# Patient Record
Sex: MALE | Race: WHITE | NOT HISPANIC OR LATINO | Employment: OTHER | URBAN - METROPOLITAN AREA
[De-identification: names, ages, dates, MRNs, and addresses within clinical notes are randomized per-mention and may not be internally consistent; named-entity substitution may affect disease eponyms.]

---

## 2022-02-22 ENCOUNTER — TELEPHONE (OUTPATIENT)
Dept: UROLOGY | Facility: CLINIC | Age: 72
End: 2022-02-22

## 2022-02-22 NOTE — TELEPHONE ENCOUNTER
----- Message from Stephanie Abrams sent at 2/22/2022  4:14 PM CST -----  Monalisa w/ [facility] [address] [phone] Hospital Consult: Dr José Manuel Ruby, DX: Renal stone w/ Hydronephrosis Rm: [phone]

## 2022-02-24 ENCOUNTER — OUTSIDE PLACE OF SERVICE (OUTPATIENT)
Dept: UROLOGY | Facility: CLINIC | Age: 72
End: 2022-02-24

## 2022-02-24 ENCOUNTER — OUTSIDE PLACE OF SERVICE (OUTPATIENT)
Dept: UROLOGY | Facility: CLINIC | Age: 72
End: 2022-02-24
Payer: MEDICARE

## 2022-02-24 PROCEDURE — 99223 1ST HOSP IP/OBS HIGH 75: CPT | Mod: 25,,, | Performed by: UROLOGY

## 2022-02-24 PROCEDURE — 99223 PR INITIAL HOSPITAL CARE,LEVL III: ICD-10-PCS | Mod: 25,,, | Performed by: UROLOGY

## 2022-02-24 PROCEDURE — 52332 CYSTOSCOPY AND TREATMENT: CPT | Mod: RT,,, | Performed by: UROLOGY

## 2022-02-24 PROCEDURE — 52332 PR CYSTOSCOPY,INSERT URETERAL STENT: ICD-10-PCS | Mod: RT,,, | Performed by: UROLOGY

## 2022-02-24 PROCEDURE — 74420 UROGRAPHY RTRGR +-KUB: CPT | Mod: 26,,, | Performed by: UROLOGY

## 2022-02-24 PROCEDURE — 74420 PR  X-RAY RETROGRADE PYELOGRAM: ICD-10-PCS | Mod: 26,,, | Performed by: UROLOGY

## 2022-03-08 ENCOUNTER — TELEPHONE (OUTPATIENT)
Dept: UROLOGY | Facility: CLINIC | Age: 72
End: 2022-03-08
Payer: MEDICARE

## 2022-03-08 NOTE — TELEPHONE ENCOUNTER
Spoke with missy and informed her his surgery is outpatient.    ----- Message from Rodolfo Gross sent at 3/8/2022  1:42 PM CST -----  Contact: [facility]  Please call Missy from [facility] regarding patient procedure    Is procedure outpatient or inpatient      Call back #  [phone]  ask for Ruth Frazier

## 2022-03-15 ENCOUNTER — OFFICE VISIT (OUTPATIENT)
Dept: UROLOGY | Facility: CLINIC | Age: 72
End: 2022-03-15
Payer: MEDICARE

## 2022-03-15 VITALS
WEIGHT: 170 LBS | DIASTOLIC BLOOD PRESSURE: 70 MMHG | HEART RATE: 70 BPM | BODY MASS INDEX: 23.8 KG/M2 | HEIGHT: 71 IN | SYSTOLIC BLOOD PRESSURE: 122 MMHG

## 2022-03-15 DIAGNOSIS — N20.0 KIDNEY STONE: Primary | ICD-10-CM

## 2022-03-15 LAB
ANION GAP SERPL CALC-SCNC: 4 MMOL/L (ref 3–11)
APPEARANCE, UA: CLEAR
BACTERIA SPEC CULT: ABNORMAL /HPF
BASOPHILS NFR BLD: 0.3 % (ref 0–3)
BILIRUB UR QL STRIP: NEGATIVE MG/DL
BUN SERPL-MCNC: 23 MG/DL (ref 7–18)
BUN/CREAT SERPL: 23.23 RATIO (ref 7–18)
CALCIUM SERPL-MCNC: 9 MG/DL (ref 8.8–10.5)
CHLORIDE SERPL-SCNC: 107 MMOL/L (ref 100–108)
CO2 SERPL-SCNC: 32 MMOL/L (ref 21–32)
COLOR UR: ABNORMAL
CREAT SERPL-MCNC: 0.99 MG/DL (ref 0.7–1.3)
EOSINOPHIL NFR BLD: 1.6 % (ref 1–3)
ERYTHROCYTE [DISTWIDTH] IN BLOOD BY AUTOMATED COUNT: 12.4 % (ref 12.5–18)
GFR ESTIMATION: > 60
GLUCOSE (UA): NORMAL MG/DL
GLUCOSE SERPL-MCNC: 80 MG/DL (ref 70–110)
HCT VFR BLD AUTO: 37.2 % (ref 42–52)
HGB BLD-MCNC: 12.4 G/DL (ref 14–18)
HGB UR QL STRIP: 250 /UL
KETONES UR QL STRIP: NEGATIVE MG/DL
LEUKOCYTE ESTERASE UR QL STRIP: 25 /UL
LYMPHOCYTES NFR BLD: 31.9 % (ref 25–40)
MCH RBC QN AUTO: 31.7 PG (ref 27–31.2)
MCHC RBC AUTO-ENTMCNC: 33.3 G/DL (ref 31.8–35.4)
MCV RBC AUTO: 95.1 FL (ref 80–97)
MONOCYTES NFR BLD: 14.6 % (ref 1–15)
NEUTROPHILS # BLD AUTO: 1.89 10*3/UL (ref 1.8–7.7)
NEUTROPHILS NFR BLD: 51.1 % (ref 37–80)
NITRITE UR QL STRIP: NEGATIVE
NUCLEATED RED BLOOD CELLS: 0 %
PH UR STRIP: 6 PH (ref 5–9)
PLATELETS: 200 10*3/UL (ref 142–424)
POTASSIUM SERPL-SCNC: 4.8 MMOL/L (ref 3.6–5.2)
PROT UR QL STRIP: ABNORMAL MG/DL
RBC # BLD AUTO: 3.91 10*6/UL (ref 4.7–6.1)
RBC #/AREA URNS HPF: ABNORMAL /HPF (ref 0–2)
SERVICE COMMENT 03: ABNORMAL
SODIUM BLD-SCNC: 143 MMOL/L (ref 135–145)
SP GR UR STRIP: 1.02 (ref 1–1.03)
SPECIMEN COLLECTION METHOD, URINE: ABNORMAL
SQUAMOUS EPITHELIAL, UA: ABNORMAL /LPF
UROBILINOGEN UR STRIP-ACNC: NORMAL MG/DL
WBC # BLD: 3.7 10*3/UL (ref 4.6–10.2)
WBC #/AREA URNS HPF: ABNORMAL /HPF (ref 0–5)

## 2022-03-15 PROCEDURE — 99214 PR OFFICE/OUTPT VISIT, EST, LEVL IV, 30-39 MIN: ICD-10-PCS | Mod: S$GLB,,, | Performed by: UROLOGY

## 2022-03-15 PROCEDURE — 99214 OFFICE O/P EST MOD 30 MIN: CPT | Mod: S$GLB,,, | Performed by: UROLOGY

## 2022-03-15 RX ORDER — AMLODIPINE BESYLATE 5 MG/1
5 TABLET ORAL DAILY
COMMUNITY
Start: 2022-01-28

## 2022-03-15 RX ORDER — DIVALPROEX SODIUM 500 MG/1
1000 TABLET, DELAYED RELEASE ORAL
COMMUNITY

## 2022-03-15 RX ORDER — CYANOCOBALAMIN 1000 UG/ML
INJECTION, SOLUTION INTRAMUSCULAR; SUBCUTANEOUS
COMMUNITY
Start: 2022-01-28

## 2022-03-15 RX ORDER — VILAZODONE HYDROCHLORIDE 40 MG/1
TABLET ORAL
COMMUNITY
Start: 2022-02-24

## 2022-03-15 RX ORDER — TRAZODONE HYDROCHLORIDE 50 MG/1
50 TABLET ORAL
COMMUNITY
Start: 2022-02-23

## 2022-03-15 RX ORDER — SERTRALINE HYDROCHLORIDE 50 MG/1
50 TABLET, FILM COATED ORAL DAILY
COMMUNITY
Start: 2022-01-28

## 2022-03-15 RX ORDER — MEMANTINE HYDROCHLORIDE 5 MG/1
TABLET ORAL
COMMUNITY
Start: 2022-02-16

## 2022-03-15 RX ORDER — PANTOPRAZOLE SODIUM 40 MG/1
TABLET, DELAYED RELEASE ORAL
COMMUNITY
Start: 2022-02-16

## 2022-03-15 NOTE — PROGRESS NOTES
Subjective:       Patient ID: Joshua Ventura is a 71 y.o. male.    Chief Complaint: No chief complaint on file.      HPI:  71-year-old male seen urgently in the hospital for an infected obstructed stone right ureteral stent was placed at the time with purulence drainage of urine immediately noted patient has been on antibiotics and has recovered from his urinary infection he is here for formal management    Past Medical History:   Past Medical History:   Diagnosis Date    Alzheimer's disease, unspecified (CODE)     Calculus of ureter     Conversion disorder with seizures or convulsions     Depression     Gastroesophageal reflux disease     Hydroureter     Hypertension     Vitamin B deficiency        Past Surgical Historical: History reviewed. No pertinent surgical history.     Medications:   Medication List with Changes/Refills   Current Medications    AMLODIPINE (NORVASC) 5 MG TABLET    Take 5 mg by mouth once daily.    CYANOCOBALAMIN 1,000 MCG/ML INJECTION    INJECT ONE ML IN THE MUSCLE ONCE A WEEK    DIVALPROEX (DEPAKOTE) 500 MG TBEC    1,000 mg.    MEMANTINE (NAMENDA) 5 MG TAB        PANTOPRAZOLE (PROTONIX) 40 MG TABLET        SERTRALINE (ZOLOFT) 50 MG TABLET    Take 50 mg by mouth once daily.    TRAZODONE (DESYREL) 50 MG TABLET    50 mg.    VIIBRYD 40 MG TAB TABLET            Past Social History:   Social History     Socioeconomic History    Marital status:    Tobacco Use    Smoking status: Never Smoker    Smokeless tobacco: Never Used   Substance and Sexual Activity    Alcohol use: Yes    Drug use: Never       Allergies:   Review of patient's allergies indicates:   Allergen Reactions    Oxycodone         Family History: History reviewed. No pertinent family history.     Review of Systems:  Review of Systems   Constitutional: Negative for activity change and appetite change.   HENT: Negative for congestion and dental problem.    Eyes: Negative for visual disturbance.   Respiratory:  Negative for chest tightness and shortness of breath.    Cardiovascular: Negative for chest pain.   Gastrointestinal: Negative for abdominal distention and abdominal pain.   Genitourinary: Negative for decreased urine volume, difficulty urinating, dysuria, enuresis, flank pain, frequency, genital sores, hematuria, penile discharge, penile pain, penile swelling, scrotal swelling, testicular pain and urgency.   Musculoskeletal: Negative for back pain and neck pain.   Skin: Negative for color change.   Neurological: Negative for dizziness.   Hematological: Negative for adenopathy.   Psychiatric/Behavioral: Negative for agitation, behavioral problems and confusion.       Physical Exam:  Physical Exam  Constitutional:       General: He is not in acute distress.     Appearance: He is well-developed.   HENT:      Head: Normocephalic and atraumatic.      Nose: Nose normal.   Eyes:      General: No scleral icterus.     Conjunctiva/sclera: Conjunctivae normal.      Pupils: Pupils are equal, round, and reactive to light.   Neck:      Thyroid: No thyromegaly.      Trachea: No tracheal deviation.   Cardiovascular:      Rate and Rhythm: Normal rate and regular rhythm.      Heart sounds: Normal heart sounds.   Pulmonary:      Effort: Pulmonary effort is normal. No respiratory distress.      Breath sounds: Normal breath sounds. No wheezing or rales.   Abdominal:      General: Bowel sounds are normal. There is no distension.      Palpations: Abdomen is soft.      Tenderness: There is no abdominal tenderness. There is no guarding or rebound.   Genitourinary:     Penis: Normal. No tenderness.       Prostate: Normal.   Musculoskeletal:         General: No deformity. Normal range of motion.      Cervical back: Neck supple.   Lymphadenopathy:      Cervical: No cervical adenopathy.   Skin:     General: Skin is warm and dry.      Findings: No erythema or rash.   Neurological:      Mental Status: He is alert and oriented to person, place,  and time.      Cranial Nerves: No cranial nerve deficit.   Psychiatric:         Behavior: Behavior normal.         Assessment/Plan:       Problem List Items Addressed This Visit    None     Visit Diagnoses     Kidney stone    -  Primary    Relevant Orders    Ambulatory Referral to External Surgery             Right ureteral stone:  Had a long discussed with the patient today about his procedures we will proceed with right ureteroscopy next available date

## 2022-03-25 ENCOUNTER — OUTSIDE PLACE OF SERVICE (OUTPATIENT)
Dept: UROLOGY | Facility: CLINIC | Age: 72
End: 2022-03-25
Payer: MEDICARE

## 2022-03-25 DIAGNOSIS — N20.0 KIDNEY STONE: Primary | ICD-10-CM

## 2022-03-25 PROCEDURE — 74420 UROGRAPHY RTRGR +-KUB: CPT | Mod: 26,,, | Performed by: UROLOGY

## 2022-03-25 PROCEDURE — 52352 CYSTOURETERO W/STONE REMOVE: CPT | Mod: RT,,, | Performed by: UROLOGY

## 2022-03-25 PROCEDURE — 52332 PR CYSTOSCOPY,INSERT URETERAL STENT: ICD-10-PCS | Mod: 51,RT,, | Performed by: UROLOGY

## 2022-03-25 PROCEDURE — 52352 PR CYSTO/URETERO/PYELOSCOPY, CALCULUS TX: ICD-10-PCS | Mod: RT,,, | Performed by: UROLOGY

## 2022-03-25 PROCEDURE — 52332 CYSTOSCOPY AND TREATMENT: CPT | Mod: 51,RT,, | Performed by: UROLOGY

## 2022-03-25 PROCEDURE — 74420 PR  X-RAY RETROGRADE PYELOGRAM: ICD-10-PCS | Mod: 26,,, | Performed by: UROLOGY

## 2022-03-28 ENCOUNTER — TELEPHONE (OUTPATIENT)
Dept: UROLOGY | Facility: CLINIC | Age: 72
End: 2022-03-28
Payer: MEDICARE

## 2022-03-28 LAB
CALCULI COMPOSITION: NORMAL
CALCULI DESCRIPTION: NORMAL
CALCULI MASS: 14 MG
CALCULI PDF: NORMAL

## 2022-03-28 NOTE — TELEPHONE ENCOUNTER
----- Message from Hui Rodriges sent at 3/28/2022  8:51 AM CDT -----  Patient returning call to nurse. Call back number [phone]. Tks

## 2022-04-13 ENCOUNTER — TELEPHONE (OUTPATIENT)
Dept: UROLOGY | Facility: CLINIC | Age: 72
End: 2022-04-13
Payer: MEDICARE

## 2022-04-13 NOTE — PATIENT INSTRUCTIONS
Patient Education       Ureteral Stent Discharge Instructions   About this topic   The kidneys remove waste from the blood and get rid of it through your urine. Normally, your urine drains from your kidneys through a narrow tube into your bladder and out of your body. This narrow tube is a ureter. Some diseases may block the flow of urine through your ureter. Then, your urine can back up, cause pain, damage your kidneys, and cause infection.  A ureteral stent is a thin tube that allows urine to drain from the body when the normal flow of urine through your ureter is blocked. The ends of the tube are shaped like a coil to hold the tube in place. One end rests in your bladder and the other end rests in your kidney. This tube keeps the ureter open so urine can pass from your kidneys into your bladder and out of the body.  How long you will need the stent will depend on the reason for it. Most often, the stent can be taken out in a few days or weeks. If it is needed longer than a few months, it will need to be replaced. Some kinds of stents can be left in for much longer. Your doctor will decide how long you will need your stent.  A stent may be used to bypass a blockage in the ureter caused by a stone, tumor, swelling, or narrowing.     What care is needed at home?   Ask your doctor what you need to do when you go home. Make sure you ask questions if you do not understand what the doctor says. This way you will know what you need to do.  Take all drugs as ordered by your doctor.  Drink 6 to 8 glasses of liquids each day to avoid an infection unless your doctor asks you to limit fluids for some other health problem.  If you have a stent with a thread that is outside your body, make sure you do not pull on the thread and pull the stent out.  What follow-up care is needed?   Your doctor may ask you to make visits to the office to check on your progress. Be sure to keep these visits.  A cystoscope is a telescope-like  device put into the opening where urine comes out of your body. It can also be used to place the stent or take out the stent. You may be given a local pain drug for this. Some stents have a string or thread attached to them that stays outside the body. You or the doctor can take out this kind of stent by pulling on the thread. Talk with your doctor about when you will have your stent removed.  If your stent needs to be in longer than 3 months, it will need to be exchanged to make sure it doesnt get stuck.  What drugs may be needed?   The doctor may order drugs to:  Help with pain  Relax bladder muscles  Prevent or fight an infection  Will physical activity be limited?   You may go back to your normal daily activity when your doctor tells you it is OK.  What problems could happen?   Infection  Bladder irritation  Blood in the urine  Ureteral injury  Stent moves out of place  Stent gets clogged  You are not able to pass urine  Scarring of the urethra or ureter  When do I need to call the doctor?   Signs of infection. These include a fever of 100.4°F (38°C) or higher; chills; pain with passing urine; urine changes color, becomes cloudy, or smells bad; or not able to pass urine.  Very bad pain  Lots of blood in your urine  Urine is dribbling out most of the time  Stent comes out  Teach Back: Helping You Understand   The Teach Back Method helps you understand the information we are giving you. The idea is simple. After talking with the staff, tell them in your own words what you were just told. This helps to make sure the staff has covered each thing clearly. It also helps to explain things that may have been a bit confusing. Before going home, make sure you are able to do these:  I can tell you about my procedure.  I can tell you how I will take care of the needle site.  I can tell you what I will do if I have a fever, pain with passing urine, my urine is cloudy or bloody, or other signs of problems.  Where can I learn  more?   [employer]  https://www.radiologyinfo.org/en/info.cfm?pg=ureteralnephro   [place] [employer]  https://www.baus.org.uk/_userfiles/pages/files/Patients/Leaflets/Ureteric%20stent%20insertion.pdf   Last Reviewed Date   2019-08-23  Consumer Information Use and Disclaimer   This information is not specific medical advice and does not replace information you receive from your health care provider. This is only a brief summary of general information. It does NOT include all information about conditions, illnesses, injuries, tests, procedures, treatments, therapies, discharge instructions or life-style choices that may apply to you. You must talk with your health care provider for complete information about your health and treatment options. This information should not be used to decide whether or not to accept your health care providers advice, instructions or recommendations. Only your health care provider has the knowledge and training to provide advice that is right for you.  Copyright   Copyright © 2021 [employer] and its affiliates and/or licensors. All rights reserved.

## 2022-04-14 ENCOUNTER — PROCEDURE VISIT (OUTPATIENT)
Dept: UROLOGY | Facility: CLINIC | Age: 72
End: 2022-04-14
Payer: MEDICARE

## 2022-04-14 VITALS
RESPIRATION RATE: 16 BRPM | WEIGHT: 178 LBS | OXYGEN SATURATION: 98 % | BODY MASS INDEX: 24.83 KG/M2 | HEART RATE: 60 BPM | SYSTOLIC BLOOD PRESSURE: 174 MMHG | DIASTOLIC BLOOD PRESSURE: 80 MMHG

## 2022-04-14 DIAGNOSIS — N20.0 KIDNEY STONE: ICD-10-CM

## 2022-04-14 PROCEDURE — 52310 CYSTOSCOPY AND TREATMENT: CPT | Mod: S$GLB,,, | Performed by: UROLOGY

## 2022-04-14 PROCEDURE — 52310 CYSTOSCOPY: ICD-10-PCS | Mod: S$GLB,,, | Performed by: UROLOGY

## 2022-04-14 NOTE — PROCEDURES
Cystoscopy    Date/Time: 4/14/2022 11:30 AM  Performed by: Jovany Gordon MD  Authorized by: Jovany Gordon MD     Consent Done?:  Yes (Written)  Timeout: prior to procedure the correct patient, procedure, and site was verified    Prep: patient was prepped and draped in usual sterile fashion    Anesthesia:  Intraurethral instillation  Position:  Supine  Anesthesia:  Intraurethral instillation  Patient sedated?: No    Preparation: Patient was prepped and draped in usual sterile fashion    Scope type:  Flexible cystoscope  Stent removed: Yes     Patient tolerated the procedure well with no immediate complications  Comments:      Patient was brought to the procedure room placed on the table in spine position. The patient was prepped and draped in the usual sterile fashion. The cystoscope was inserted into the urethra advanced the urethra and bladder were normal the stent was visualized it was grasped and removed the bladder was inspected found to be free of tumor stone or foreign body.  The patient tolerated the procedure well there were no complications

## 2022-05-11 PROCEDURE — 87088 URINE BACTERIA CULTURE: CPT | Performed by: NURSE PRACTITIONER

## 2022-05-13 ENCOUNTER — LAB REQUISITION (OUTPATIENT)
Dept: LAB | Facility: HOSPITAL | Age: 72
End: 2022-05-13
Payer: MEDICARE

## 2022-05-13 DIAGNOSIS — R41.3 OTHER AMNESIA: ICD-10-CM

## 2022-05-13 DIAGNOSIS — G47.19 OTHER HYPERSOMNIA: ICD-10-CM

## 2022-05-13 DIAGNOSIS — F03.90 UNSPECIFIED DEMENTIA WITHOUT BEHAVIORAL DISTURBANCE: ICD-10-CM

## 2022-05-15 LAB — BACTERIA UR CULT: NORMAL

## 2025-06-30 ENCOUNTER — LAB REQUISITION (OUTPATIENT)
Dept: LAB | Facility: HOSPITAL | Age: 75
End: 2025-06-30
Attending: PSYCHIATRY & NEUROLOGY
Payer: MEDICARE

## 2025-06-30 DIAGNOSIS — R82.5 ELEVATED URINE LEVELS OF DRUGS, MEDICAMENTS AND BIOLOGICAL SUBSTANCES: ICD-10-CM

## 2025-06-30 LAB
AMPHET UR QL SCN: NEGATIVE
BARBITURATE SCN PRESENT UR: NEGATIVE
BENZODIAZ UR QL SCN: NEGATIVE
CANNABINOIDS UR QL SCN: POSITIVE
COCAINE UR QL SCN: NEGATIVE
METHADONE UR QL SCN: NEGATIVE
OPIATES UR QL SCN: NEGATIVE
PCP UR QL: NEGATIVE
PH UR: 6 [PH] (ref 5–8)

## 2025-06-30 PROCEDURE — 80307 DRUG TEST PRSMV CHEM ANLYZR: CPT | Performed by: PSYCHIATRY & NEUROLOGY

## 2025-07-01 ENCOUNTER — OUTSIDE PLACE OF SERVICE (OUTPATIENT)
Dept: FAMILY MEDICINE | Facility: CLINIC | Age: 75
End: 2025-07-01
Payer: MEDICARE

## 2025-07-01 PROCEDURE — 99222 1ST HOSP IP/OBS MODERATE 55: CPT | Mod: ,,, | Performed by: FAMILY MEDICINE

## 2025-07-06 ENCOUNTER — LAB REQUISITION (OUTPATIENT)
Dept: LAB | Facility: HOSPITAL | Age: 75
End: 2025-07-06
Payer: MEDICARE

## 2025-07-06 DIAGNOSIS — D64.9 ANEMIA, UNSPECIFIED: ICD-10-CM

## 2025-07-06 LAB
COLOR STL: ABNORMAL
CONSISTENCY STL: ABNORMAL
HEMOCCULT SP1 STL QL: POSITIVE

## 2025-07-06 PROCEDURE — 82270 OCCULT BLOOD FECES: CPT | Performed by: INTERNAL MEDICINE
